# Patient Record
Sex: FEMALE | Race: WHITE | URBAN - NONMETROPOLITAN AREA
[De-identification: names, ages, dates, MRNs, and addresses within clinical notes are randomized per-mention and may not be internally consistent; named-entity substitution may affect disease eponyms.]

---

## 2023-02-09 ENCOUNTER — OFFICE VISIT (OUTPATIENT)
Dept: SURGERY | Age: 33
End: 2023-02-09
Payer: COMMERCIAL

## 2023-02-09 VITALS
TEMPERATURE: 98.5 F | WEIGHT: 127.8 LBS | HEIGHT: 60 IN | OXYGEN SATURATION: 100 % | HEART RATE: 75 BPM | BODY MASS INDEX: 25.09 KG/M2

## 2023-02-09 DIAGNOSIS — N61.1 LEFT BREAST ABSCESS: Primary | ICD-10-CM

## 2023-02-09 PROCEDURE — 99213 OFFICE O/P EST LOW 20 MIN: CPT | Performed by: PHYSICIAN ASSISTANT

## 2023-02-09 PROCEDURE — 10061 I&D ABSCESS COMP/MULTIPLE: CPT | Performed by: PHYSICIAN ASSISTANT

## 2023-02-09 RX ORDER — IBUPROFEN 800 MG/1
800 TABLET ORAL EVERY 8 HOURS PRN
Qty: 30 TABLET | Refills: 0 | Status: SHIPPED | OUTPATIENT
Start: 2023-02-09

## 2023-02-09 RX ORDER — DOXYCYCLINE HYCLATE 100 MG
100 TABLET ORAL 2 TIMES DAILY
Qty: 20 TABLET | Refills: 0 | Status: SHIPPED | OUTPATIENT
Start: 2023-02-09 | End: 2023-02-19

## 2023-02-10 NOTE — PROGRESS NOTES
Subjective: This visit was completed with the aid of an , Jenny Villarreal. Patient ID: Laisha Garza is a 27 y.o. female. HPI  Ms. Artelia Kayser presents with a painful left breast mass she noticed 2 months ago. She went to her local health department and was placed on an antibiotic and had imaging demonstrating a complex cystic mass. Laisha Garza is a 27 y.o. female with the following history as recorded in [facility]: There are no problems to display for this patient. Current Outpatient Medications   Medication Sig Dispense Refill    doxycycline hyclate (VIBRA-TABS) 100 MG tablet Take 1 tablet by mouth 2 times daily for 10 days 20 tablet 0    ibuprofen (ADVIL;MOTRIN) 800 MG tablet Take 1 tablet by mouth every 8 hours as needed for Pain 30 tablet 0    mupirocin (BACTROBAN) 2 % ointment Apply topically 2 times daily 30 g 0    topiramate (TOPAMAX) 50 MG tablet Take 1 tablet by mouth 2 times daily 60 tablet 3    SUMAtriptan (IMITREX) 50 MG tablet Take 1 tablet at the onset of migraine. Can repeat once in 2 hours if no improvement. Do not exceed 2 tablets in 24 hours. 9 tablet 3     No current facility-administered medications for this visit. Allergies: Patient has no known allergies. No past medical history on file. No past surgical history on file. No family history on file. Social History     Tobacco Use    Smoking status: Never    Smokeless tobacco: Never   Substance Use Topics    Alcohol use: Not Currently       Review of Systems   All other systems reviewed and are negative. Objective:   Physical Exam  Constitutional:       Appearance: Normal appearance. Chest:       Skin:     General: Skin is warm and dry. Neurological:      General: No focal deficit present. Mental Status: She is alert and oriented to person, place, and time. Psychiatric:         Mood and Affect: Mood normal.         Behavior: Behavior normal.         Thought Content:  Thought content normal.         Judgment: Judgment normal.       Assessment:       Diagnosis Orders   1. Left breast abscess  Culture, Aerobic and Anaerobic              Plan:      We discussed the need for I&D and she would like to proceed.           Jasmeet Lindsay PA-C

## 2023-02-10 NOTE — PROGRESS NOTES
PROCEDURE:  The skin was cleaned with alcohol and anesthetized with 1% lidocaine with epinephrine. The skin was prepped with chloroprep. An 11 blade was used to incise the abscess. Copious amounts of pus drained from the wound. A culture was taken. The wound was irrigated with saline and peroxide. Packing was placed. I advised her to remove the packing in 24 hours. I prescribed doxycycline, ibuprofen, and bactroban. After removing the packing, she will clean the wound with peroxide and apply bactroban twice daily. I will see her back in 1 week.

## 2023-02-12 LAB
ANAEROBIC CULTURE: NORMAL
GRAM STAIN RESULT: NORMAL
WOUND/ABSCESS: NORMAL

## 2023-02-16 ENCOUNTER — OFFICE VISIT (OUTPATIENT)
Dept: SURGERY | Age: 33
End: 2023-02-16

## 2023-02-16 VITALS
BODY MASS INDEX: 24.94 KG/M2 | WEIGHT: 127 LBS | OXYGEN SATURATION: 98 % | HEART RATE: 80 BPM | HEIGHT: 60 IN | TEMPERATURE: 97.5 F

## 2023-02-16 DIAGNOSIS — Z09 POSTOPERATIVE EXAMINATION: Primary | ICD-10-CM

## 2023-02-16 PROCEDURE — 99024 POSTOP FOLLOW-UP VISIT: CPT | Performed by: PHYSICIAN ASSISTANT

## 2023-02-27 NOTE — PROGRESS NOTES
Postop Progress Note    Subjective    Lisset Bentley presents to the office for postop follow up after I&D left breast.    Objective    Vitals:    02/16/23 1002   Pulse: 80   Temp: 97.5 °F (36.4 °C)   SpO2: 98%     General: alert, cooperative and no distress  Incision: healing well    Assessment  Doing well postoperatively. Plan  I will see her in 2 weeks for follow up.       Electronically signed by Shantel Cat PA-C on 2/27/2023 at 8:46 AM

## 2023-03-07 ENCOUNTER — OFFICE VISIT (OUTPATIENT)
Dept: SURGERY | Age: 33
End: 2023-03-07

## 2023-03-07 VITALS
WEIGHT: 128.6 LBS | HEIGHT: 60 IN | HEART RATE: 75 BPM | OXYGEN SATURATION: 99 % | TEMPERATURE: 98.6 F | BODY MASS INDEX: 25.25 KG/M2

## 2023-03-07 DIAGNOSIS — Z09 POSTOPERATIVE EXAMINATION: Primary | ICD-10-CM

## 2023-03-07 DIAGNOSIS — N61.1 LEFT BREAST ABSCESS: ICD-10-CM

## 2023-03-07 PROCEDURE — 99024 POSTOP FOLLOW-UP VISIT: CPT | Performed by: PHYSICIAN ASSISTANT

## 2023-03-07 RX ORDER — IBUPROFEN 800 MG/1
800 TABLET ORAL EVERY 8 HOURS PRN
Qty: 90 TABLET | Refills: 0 | Status: SHIPPED | OUTPATIENT
Start: 2023-03-07

## 2023-03-07 NOTE — PROGRESS NOTES
Postop Progress Note    Subjective    Luis Manuel Marttingadrian Bentley presents to the office for postop follow up after I&D left breast.    Objective    Vitals:    03/07/23 0945   Pulse: 75   Temp: 98.6 °F (37 °C)   SpO2: 99%     General: alert, cooperative and no distress  Incision: healing well    Assessment  Doing well postoperatively. Plan  I will see her prn.         Electronically signed by Shanel Dailey PA-C on 3/7/2023 at 9:58 AM

## 2023-05-30 RX ORDER — TOPIRAMATE 50 MG/1
TABLET, FILM COATED ORAL
Qty: 60 TABLET | Refills: 3 | Status: SHIPPED | OUTPATIENT
Start: 2023-05-30

## 2023-05-30 NOTE — TELEPHONE ENCOUNTER
Requested Prescriptions     Pending Prescriptions Disp Refills    topiramate (TOPAMAX) 50 MG tablet [Pharmacy Med Name: TOPIRAMATE 50MG TABLETS] 60 tablet 3     Sig: TAKE 1 TABLET BY MOUTH TWICE DAILY       Last Office Visit: 7/12/2022  Next Office Visit: Visit date not found  Last Medication Refill: 07/12/2022 with 3 RF

## 2023-06-02 ENCOUNTER — TELEPHONE (OUTPATIENT)
Dept: NEUROLOGY | Age: 33
End: 2023-06-02

## 2023-06-02 NOTE — TELEPHONE ENCOUNTER
Received est. Pt referral for pt to get back in for follow up. Patient last seen 7/2022. Unable to contact patient. Made appointment and mailed letter to address on file with the details.